# Patient Record
Sex: MALE | URBAN - METROPOLITAN AREA
[De-identification: names, ages, dates, MRNs, and addresses within clinical notes are randomized per-mention and may not be internally consistent; named-entity substitution may affect disease eponyms.]

---

## 2017-08-02 ENCOUNTER — TELEPHONE (OUTPATIENT)
Dept: UROLOGY | Facility: CLINIC | Age: 52
End: 2017-08-02

## 2017-08-02 NOTE — TELEPHONE ENCOUNTER
Called and informed patient about the Vasectomy procedure.  Patient will call to schedule consult if desires. Marie Meneses RN

## 2017-08-02 NOTE — TELEPHONE ENCOUNTER
Reason for call:  Other   Patient called regarding (reason for call): call back  Additional comments: patient would like a call back regarding no scalpel vasectomy procedure.      Phone number to reach patient:  Work number on file:  [phone] (work)    Best Time:  anytime    Can we leave a detailed message on this number?  YES